# Patient Record
Sex: FEMALE | URBAN - METROPOLITAN AREA
[De-identification: names, ages, dates, MRNs, and addresses within clinical notes are randomized per-mention and may not be internally consistent; named-entity substitution may affect disease eponyms.]

---

## 2024-06-25 ENCOUNTER — APPOINTMENT (OUTPATIENT)
Dept: URBAN - METROPOLITAN AREA SURGERY 9 | Age: 49
Setting detail: DERMATOLOGY
End: 2024-06-25

## 2024-06-25 DIAGNOSIS — L91.0 HYPERTROPHIC SCAR: ICD-10-CM

## 2024-06-25 PROCEDURE — OTHER INTRALESIONAL KENALOG: OTHER

## 2024-06-25 PROCEDURE — 11900 INJECT SKIN LESIONS </W 7: CPT

## 2024-06-25 PROCEDURE — OTHER MIPS QUALITY: OTHER

## 2024-06-25 PROCEDURE — OTHER COUNSELING: OTHER

## 2024-06-25 ASSESSMENT — LOCATION DETAILED DESCRIPTION DERM
LOCATION DETAILED: RIGHT SUPRAPUBIC SKIN
LOCATION DETAILED: PERIUMBILICAL SKIN

## 2024-06-25 ASSESSMENT — LOCATION SIMPLE DESCRIPTION DERM
LOCATION SIMPLE: GROIN
LOCATION SIMPLE: ABDOMEN

## 2024-06-25 ASSESSMENT — LOCATION ZONE DERM: LOCATION ZONE: TRUNK

## 2024-06-25 NOTE — PROCEDURE: INTRALESIONAL KENALOG
Administered By (Optional): Yola Frank PA-C
How Many Mls Were Removed From The 80 Mg/Ml (5ml) Vial When Preparing The Injectable Solution?: 0
Kenalog Preparation: Kenalog
Ndc# For Kenalog Only: 9277-1566-18
Total Volume (Ccs): 0.5
Consent: The risks of atrophy were reviewed with the patient.
Concentration Of Kenalog Solution Injected (Mg/Ml): 40.0
Include Z78.9 (Other Specified Conditions Influencing Health Status) As An Associated Diagnosis?: No
Validate Note Data When Using Inventory: Yes
Show Inventory Tab: Hide
Lot # For Kenalog (Optional): [id number]
Kenalog Type Of Vial: Multiple Dose
Detail Level: Detailed
Expiration Date For Kenalog (Optional): 02/2025
Medical Necessity Clause: This procedure was medically necessary because the lesions that were treated were:

## 2024-06-25 NOTE — HPI: SKIN LESIONS
Is This A New Presentation, Or A Follow-Up?: Skin Lesions
Additional History: Pt had gotten surgery on aa about 10 years ago but a month ago she felt the sites opened up and there was a bright green color in drainage. She felt very sick due to infection. Saw PCP and was prescribed Keflex for 14 days (every 6 hours). 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

## 2024-07-23 ENCOUNTER — APPOINTMENT (OUTPATIENT)
Dept: URBAN - METROPOLITAN AREA SURGERY 9 | Age: 49
Setting detail: DERMATOLOGY
End: 2024-07-23

## 2024-07-23 DIAGNOSIS — L91.0 HYPERTROPHIC SCAR: ICD-10-CM

## 2024-07-23 PROCEDURE — OTHER MIPS QUALITY: OTHER

## 2024-07-23 PROCEDURE — OTHER INTRALESIONAL KENALOG: OTHER

## 2024-07-23 PROCEDURE — 11900 INJECT SKIN LESIONS </W 7: CPT

## 2024-07-23 PROCEDURE — OTHER COUNSELING: OTHER

## 2024-07-23 ASSESSMENT — LOCATION DETAILED DESCRIPTION DERM
LOCATION DETAILED: RIGHT POSTERIOR EARLOBE
LOCATION DETAILED: RIGHT SUPRAPUBIC SKIN
LOCATION DETAILED: LEFT POSTERIOR EARLOBE
LOCATION DETAILED: PERIUMBILICAL SKIN

## 2024-07-23 ASSESSMENT — LOCATION SIMPLE DESCRIPTION DERM
LOCATION SIMPLE: ABDOMEN
LOCATION SIMPLE: LEFT EAR
LOCATION SIMPLE: RIGHT EAR
LOCATION SIMPLE: GROIN

## 2024-07-23 ASSESSMENT — LOCATION ZONE DERM
LOCATION ZONE: TRUNK
LOCATION ZONE: EAR

## 2024-07-23 NOTE — PROCEDURE: INTRALESIONAL KENALOG
Administered By (Optional): Yola Frank PA-C
How Many Mls Were Removed From The 80 Mg/Ml (5ml) Vial When Preparing The Injectable Solution?: 0
Kenalog Preparation: Kenalog
Ndc# For Kenalog Only: 6526-3039-13
Total Volume (Ccs): 0.6
Consent: The risks of atrophy were reviewed with the patient.
Concentration Of Kenalog Solution Injected (Mg/Ml): 40.0
Include Z78.9 (Other Specified Conditions Influencing Health Status) As An Associated Diagnosis?: No
Validate Note Data When Using Inventory: Yes
Show Inventory Tab: Hide
Lot # For Kenalog (Optional): [id number]
Kenalog Type Of Vial: Multiple Dose
Detail Level: Detailed
Expiration Date For Kenalog (Optional): 02/2025
Medical Necessity Clause: This procedure was medically necessary because the lesions that were treated were:

## 2024-08-22 ENCOUNTER — APPOINTMENT (OUTPATIENT)
Dept: URBAN - METROPOLITAN AREA SURGERY 9 | Age: 49
Setting detail: DERMATOLOGY
End: 2024-08-22

## 2024-08-22 DIAGNOSIS — L91.0 HYPERTROPHIC SCAR: ICD-10-CM

## 2024-08-22 PROCEDURE — 11900 INJECT SKIN LESIONS </W 7: CPT

## 2024-08-22 PROCEDURE — OTHER MIPS QUALITY: OTHER

## 2024-08-22 PROCEDURE — OTHER COUNSELING: OTHER

## 2024-08-22 PROCEDURE — OTHER INTRALESIONAL KENALOG: OTHER

## 2024-08-22 ASSESSMENT — LOCATION SIMPLE DESCRIPTION DERM
LOCATION SIMPLE: ABDOMEN
LOCATION SIMPLE: RIGHT EAR
LOCATION SIMPLE: LEFT EAR

## 2024-08-22 ASSESSMENT — LOCATION ZONE DERM
LOCATION ZONE: EAR
LOCATION ZONE: TRUNK

## 2024-08-22 ASSESSMENT — LOCATION DETAILED DESCRIPTION DERM
LOCATION DETAILED: RIGHT POSTERIOR EARLOBE
LOCATION DETAILED: PERIUMBILICAL SKIN
LOCATION DETAILED: LEFT POSTERIOR EARLOBE

## 2024-08-22 NOTE — PROCEDURE: INTRALESIONAL KENALOG
Administered By (Optional): Yola Frank PA-C
How Many Mls Were Removed From The 80 Mg/Ml (5ml) Vial When Preparing The Injectable Solution?: 0
Kenalog Preparation: Kenalog
Treatment Number (Optional): 3
Ndc# For Kenalog Only: 53337-662-51
Total Volume (Ccs): 0.5
Consent: The risks of atrophy were reviewed with the patient.
Concentration Of Kenalog Solution Injected (Mg/Ml): 40.0
Include Z78.9 (Other Specified Conditions Influencing Health Status) As An Associated Diagnosis?: No
Validate Note Data When Using Inventory: Yes
Show Inventory Tab: Hide
Lot # For Kenalog (Optional): [id number]
Kenalog Type Of Vial: Multiple Dose
Detail Level: Detailed
Expiration Date For Kenalog (Optional): 07/2025
Medical Necessity Clause: This procedure was medically necessary because the lesions that were treated were: